# Patient Record
Sex: FEMALE | Race: WHITE | NOT HISPANIC OR LATINO | URBAN - METROPOLITAN AREA
[De-identification: names, ages, dates, MRNs, and addresses within clinical notes are randomized per-mention and may not be internally consistent; named-entity substitution may affect disease eponyms.]

---

## 2022-01-31 ENCOUNTER — OFFICE VISIT (OUTPATIENT)
Dept: URBAN - METROPOLITAN AREA CLINIC 78 | Facility: CLINIC | Age: 57
End: 2022-01-31
Payer: COMMERCIAL

## 2022-01-31 VITALS
HEART RATE: 76 BPM | TEMPERATURE: 98.1 F | HEIGHT: 68 IN | BODY MASS INDEX: 23.98 KG/M2 | WEIGHT: 158.2 LBS | DIASTOLIC BLOOD PRESSURE: 72 MMHG | SYSTOLIC BLOOD PRESSURE: 108 MMHG

## 2022-01-31 DIAGNOSIS — D68.51 FACTOR V LEIDEN: ICD-10-CM

## 2022-01-31 DIAGNOSIS — R14.0 FLATULENCE/GAS PAIN/BELCHING: ICD-10-CM

## 2022-01-31 DIAGNOSIS — Z90.49 HISTORY OF CHOLECYSTECTOMY: ICD-10-CM

## 2022-01-31 DIAGNOSIS — R19.7 ACUTE DIARRHEA: ICD-10-CM

## 2022-01-31 PROBLEM — 428882003: Status: ACTIVE | Noted: 2022-01-31

## 2022-01-31 PROBLEM — 307091009: Status: ACTIVE | Noted: 2022-01-31

## 2022-01-31 PROCEDURE — 99244 OFF/OP CNSLTJ NEW/EST MOD 40: CPT | Performed by: INTERNAL MEDICINE

## 2022-01-31 PROCEDURE — 99204 OFFICE O/P NEW MOD 45 MIN: CPT | Performed by: INTERNAL MEDICINE

## 2022-01-31 RX ORDER — CHOLESTYRAMINE 4 G/9G
1 PACKET MIXED WITH WATER OR NON-CARBONATED DRINK POWDER, FOR SUSPENSION ORAL TWICE A DAY
Qty: 60 | Refills: 0 | OUTPATIENT
Start: 2022-01-31

## 2022-01-31 NOTE — HPI-TODAY'S VISIT:
Patient was referred by Maura Crandall  A copy of this document will be sent to the physician.   Patient presents for gas and belching x 1 year. Previously  she states that she was taking  Imodium one a day occasionally. SInce Christmas she has worsening of her diarrhea needing to take 3 Imodium  She thinks she has SIBO because in oct she took abx for poison IVY, her symptoms were better   Patient has personal hx of Factor V leidin deficiency ; Needs anticoag post procedure for Sclerotherapy for veins.   Last colonoscopy in 1998 :  Never had EGD   Diarrhea : Once a week formed stools  Normally very loose occasionally fat floating and watery stools  Post prandial she feels bloated and it helps with Diarrhea .TUMS      Denies rectal bleeding  Denies constipation  Denies  weight loss  Takes Multivitamin  Denies generalized anxiety   Denies joint pain  Daughter has Crohns disease/IBS    Beano helps a little   Last lab with PCP :  2/20/20221  ALk PO 4 121  ALT 65  AST 24  TSH is normal    TG normal  CBC is normal  Hg 13.9       Has lactose intolerance  Avoid Sodas   No clear food triggers

## 2022-02-02 LAB
DEAMIDATED GLIADIN ABS, IGA: 3
DEAMIDATED GLIADIN ABS, IGG: 2
ENDOMYSIAL ANTIBODY IGA: NEGATIVE
IMMUNOGLOBULIN A, QN, SERUM: 186
T-TRANSGLUTAMINASE (TTG) IGA: <2
T-TRANSGLUTAMINASE (TTG) IGG: <2

## 2022-02-04 ENCOUNTER — TELEPHONE ENCOUNTER (OUTPATIENT)
Dept: URBAN - METROPOLITAN AREA CLINIC 78 | Facility: CLINIC | Age: 57
End: 2022-02-04

## 2022-02-09 LAB
CALPROTECTIN, FECAL: <16
PANCREATIC ELASTASE, FECAL: 320

## 2022-04-19 ENCOUNTER — OFFICE VISIT (OUTPATIENT)
Dept: URBAN - METROPOLITAN AREA SURGERY CENTER 15 | Facility: SURGERY CENTER | Age: 57
End: 2022-04-19

## 2022-12-01 ENCOUNTER — TELEPHONE ENCOUNTER (OUTPATIENT)
Dept: URBAN - METROPOLITAN AREA CLINIC 78 | Facility: CLINIC | Age: 57
End: 2022-12-01

## 2022-12-01 RX ORDER — CHOLESTYRAMINE 4 G/9G
1 PACKET MIXED WITH WATER OR NON-CARBONATED DRINK POWDER, FOR SUSPENSION ORAL TWICE A DAY
Qty: 60 | Refills: 0
Start: 2022-01-31

## 2023-01-26 ENCOUNTER — TELEPHONE ENCOUNTER (OUTPATIENT)
Dept: URBAN - METROPOLITAN AREA CLINIC 78 | Facility: CLINIC | Age: 58
End: 2023-01-26

## 2023-01-26 RX ORDER — CHOLESTYRAMINE 4 G/9G
1 PACKET MIXED WITH WATER OR NON-CARBONATED DRINK POWDER, FOR SUSPENSION ORAL TWICE A DAY
Qty: 60 | Refills: 0
Start: 2022-01-31

## 2023-02-20 ENCOUNTER — DASHBOARD ENCOUNTERS (OUTPATIENT)
Age: 58
End: 2023-02-20

## 2023-02-20 ENCOUNTER — OFFICE VISIT (OUTPATIENT)
Dept: URBAN - METROPOLITAN AREA CLINIC 78 | Facility: CLINIC | Age: 58
End: 2023-02-20
Payer: COMMERCIAL

## 2023-02-20 VITALS
HEART RATE: 97 BPM | TEMPERATURE: 98.1 F | BODY MASS INDEX: 23.7 KG/M2 | SYSTOLIC BLOOD PRESSURE: 102 MMHG | WEIGHT: 156.4 LBS | DIASTOLIC BLOOD PRESSURE: 67 MMHG | HEIGHT: 68 IN | RESPIRATION RATE: 16 BRPM

## 2023-02-20 DIAGNOSIS — K90.89 BILE SALT-INDUCED DIARRHEA: ICD-10-CM

## 2023-02-20 DIAGNOSIS — Z12.11 SCREENING FOR COLON CANCER: ICD-10-CM

## 2023-02-20 PROBLEM — 69980003: Status: ACTIVE | Noted: 2023-02-20

## 2023-02-20 PROCEDURE — 99213 OFFICE O/P EST LOW 20 MIN: CPT | Performed by: INTERNAL MEDICINE

## 2023-02-20 RX ORDER — SODIUM PICOSULFATE, MAGNESIUM OXIDE, AND ANHYDROUS CITRIC ACID 10; 3.5; 12 MG/160ML; G/160ML; G/160ML
160 ML DAY #1 AND 160 ML DAY # 2 LIQUID ORAL ONCE A DAY
Qty: 320 MILLIMETER | OUTPATIENT

## 2023-02-20 RX ORDER — CHOLESTYRAMINE 4 G/9G
1 PACKET MIXED WITH WATER OR NON-CARBONATED DRINK POWDER, FOR SUSPENSION ORAL TWICE A DAY
Qty: 60 | Refills: 0
Start: 2022-01-31

## 2023-02-20 RX ORDER — CHOLESTYRAMINE 4 G/9G
1 PACKET MIXED WITH WATER OR NON-CARBONATED DRINK POWDER, FOR SUSPENSION ORAL TWICE A DAY
Qty: 60 | Refills: 0 | Status: ACTIVE | COMMUNITY
Start: 2022-01-31

## 2023-02-20 NOTE — HPI-TODAY'S VISIT:
Patient was referred by Maura Crandall  A copy of this document will be sent to the physician.   She is  seen in follow-up.  She is primarily seen for refill.  She was treated for bile salt diarrhea and prescribed cholestyramine .She takes it 1 a day with good control  She had a physical this whether her vitamin D was checked  She reports reflux intermittently  Denies dysphagia    Patient has personal hx of Factor V leidin deficiency .Does not need anticoag post procedure for Sclerotherapy for veins.   Last colonoscopy in 1998 :  Never had EGD   Denies Fhx of colon polyps or colon cancer     Denies rectal bleeding  Denies constipation  Denies  weight loss  Takes Multivitamin  Denies generalized anxiety   Denies joint pain  Daughter has Crohns disease/IBS    Beano helps a little   Last lab with PCP :       Has lactose intolerance  Avoid Sodas   No clear food triggers

## 2023-02-20 NOTE — PHYSICAL EXAM PERIPHERAL PULSES:
2+ radial Const: Denies fever, chills  HEENT: Denies blurry vision, sore throat  Neck: Denies neck pain/stiffness  Resp: Denies coughing, SOB  Cardiovascular: + LE edema. Denies CP, palpitations  GI: Denies nausea, vomiting, abdominal pain, diarrhea, constipation, blood in stool  : + dysuria, + hematuria. Denies urinary frequency/urgency  MSK: Denies back pain  Neuro: Denies HA, dizziness, numbness, weakness  Skin: Denies rashes.

## 2023-03-21 ENCOUNTER — CLAIMS CREATED FROM THE CLAIM WINDOW (OUTPATIENT)
Dept: URBAN - METROPOLITAN AREA SURGERY CENTER 15 | Facility: SURGERY CENTER | Age: 58
End: 2023-03-21
Payer: COMMERCIAL

## 2023-03-21 ENCOUNTER — OFFICE VISIT (OUTPATIENT)
Dept: URBAN - METROPOLITAN AREA SURGERY CENTER 15 | Facility: SURGERY CENTER | Age: 58
End: 2023-03-21

## 2023-03-21 DIAGNOSIS — Z12.11 COLON CANCER SCREENING: ICD-10-CM

## 2023-03-21 PROCEDURE — G8907 PT DOC NO EVENTS ON DISCHARG: HCPCS | Performed by: INTERNAL MEDICINE

## 2023-03-21 PROCEDURE — G0121 COLON CA SCRN NOT HI RSK IND: HCPCS | Performed by: INTERNAL MEDICINE

## 2023-06-13 ENCOUNTER — TELEPHONE ENCOUNTER (OUTPATIENT)
Dept: URBAN - METROPOLITAN AREA CLINIC 78 | Facility: CLINIC | Age: 58
End: 2023-06-13

## 2023-06-13 RX ORDER — CHOLESTYRAMINE 4 G/9G
1 PACKET MIXED WITH WATER OR NON-CARBONATED DRINK POWDER, FOR SUSPENSION ORAL TWICE A DAY
Qty: 60 | Refills: 0
Start: 2022-01-31

## 2023-08-08 ENCOUNTER — TELEPHONE ENCOUNTER (OUTPATIENT)
Dept: URBAN - METROPOLITAN AREA CLINIC 78 | Facility: CLINIC | Age: 58
End: 2023-08-08

## 2023-08-08 RX ORDER — CHOLESTYRAMINE 4 G/9G
1 PACKET MIXED WITH WATER OR NON-CARBONATED DRINK POWDER, FOR SUSPENSION ORAL TWICE A DAY
Qty: 60 | Refills: 3
Start: 2022-01-31

## 2024-09-23 ENCOUNTER — WEB ENCOUNTER (OUTPATIENT)
Dept: URBAN - METROPOLITAN AREA CLINIC 78 | Facility: CLINIC | Age: 59
End: 2024-09-23

## 2024-09-23 RX ORDER — CHOLESTYRAMINE 4 G/9G
1 PACKET MIXED WITH WATER OR NON-CARBONATED DRINK POWDER, FOR SUSPENSION ORAL TWICE A DAY
Qty: 180 | Refills: 3
Start: 2022-01-31

## 2025-06-11 ENCOUNTER — OFFICE VISIT (OUTPATIENT)
Dept: URBAN - NONMETROPOLITAN AREA CLINIC 4 | Facility: CLINIC | Age: 60
End: 2025-06-11
Payer: COMMERCIAL

## 2025-06-11 DIAGNOSIS — K90.89 BILE SALT-INDUCED DIARRHEA: ICD-10-CM

## 2025-06-11 DIAGNOSIS — R14.0 BLOATING: ICD-10-CM

## 2025-06-11 DIAGNOSIS — K21.9 GASTROESOPHAGEAL REFLUX DISEASE, UNSPECIFIED WHETHER ESOPHAGITIS PRESENT: ICD-10-CM

## 2025-06-11 PROBLEM — 235595009: Status: ACTIVE | Noted: 2025-06-11

## 2025-06-11 PROCEDURE — 99214 OFFICE O/P EST MOD 30 MIN: CPT | Performed by: REGISTERED NURSE

## 2025-06-11 RX ORDER — SODIUM PICOSULFATE, MAGNESIUM OXIDE, AND ANHYDROUS CITRIC ACID 10; 3.5; 12 MG/160ML; G/160ML; G/160ML
160 ML DAY #1 AND 160 ML DAY # 2 LIQUID ORAL ONCE A DAY
Qty: 320 MILLIMETER | Status: DISCONTINUED | COMMUNITY

## 2025-06-11 RX ORDER — CHOLESTYRAMINE 4 G/9G
1 PACKET MIXED WITH WATER OR NON-CARBONATED DRINK POWDER, FOR SUSPENSION ORAL TWICE A DAY
Qty: 180 | Refills: 3 | Status: ACTIVE | COMMUNITY
Start: 2022-01-31

## 2025-06-11 RX ORDER — OMEPRAZOLE 40 MG/1
1 CAPSULE 1/2 TO 1 HOUR BEFORE MORNING MEAL CAPSULE, DELAYED RELEASE ORAL ONCE A DAY
Qty: 30 | Refills: 1 | OUTPATIENT
Start: 2025-06-11

## 2025-06-11 NOTE — HPI-TODAY'S VISIT:
Patient was referred by Maura Crandall  A copy of this document will be sent to the physician.  She is  seen in follow-up.  She is primarily seen for refill.  She was treated for bile salt diarrhea and prescribed cholestyramine .She takes it 1 a day with good control  She had a physical this whether her vitamin D was checked  She reports reflux intermittently  Denies dysphagia   Patient has personal hx of Factor V leidin deficiency .Does not need anticoag post procedure for Sclerotherapy for veins.  Last colonoscopy in 1998 :  Never had EGD   Denies Fhx of colon polyps or colon cancer  Denies rectal bleeding  Denies constipation  Denies  weight loss  Takes Multivitamin  Denies generalized anxiety   Denies joint pain  Daughter has Crohns disease/IBS   Beano helps a little   Last lab with PCP :  Has lactose intolerance  Avoid Sodas   No clear food triggers  6/11/25: Pt RTC with persistent heartburn, reflux, bloating and gas over the past few months. She has taken Tums and Pepcid as needed with some relief. Denies N/V, dysphagia, abdominal pain, melena, hematochezia or unintentional weight loss. Takes Questran daily for BAD.

## 2025-06-13 LAB — H PYLORI BREATH TEST: NOT DETECTED

## 2025-07-07 ENCOUNTER — ERX REFILL RESPONSE (OUTPATIENT)
Dept: URBAN - NONMETROPOLITAN AREA CLINIC 4 | Facility: CLINIC | Age: 60
End: 2025-07-07

## 2025-07-07 RX ORDER — OMEPRAZOLE 40 MG/1
1 CAPSULE 1/2 TO 1 HOUR BEFORE MORNING MEAL CAPSULE, DELAYED RELEASE ORAL ONCE A DAY
Qty: 30 | Refills: 1 | OUTPATIENT

## 2025-07-07 RX ORDER — OMEPRAZOLE 40 MG/1
TAKE 1 CAPSULE BY MOUTH 1/2 TO 1 HOUR BEFORE MORNING MEAL ONCE A DAY CAPSULE, DELAYED RELEASE ORAL
Qty: 90 CAPSULE | Refills: 1 | OUTPATIENT

## 2025-07-23 ENCOUNTER — OFFICE VISIT (OUTPATIENT)
Dept: URBAN - NONMETROPOLITAN AREA CLINIC 4 | Facility: CLINIC | Age: 60
End: 2025-07-23
Payer: COMMERCIAL

## 2025-07-23 DIAGNOSIS — R14.0 BLOATING: ICD-10-CM

## 2025-07-23 DIAGNOSIS — K21.9 GASTROESOPHAGEAL REFLUX DISEASE, UNSPECIFIED WHETHER ESOPHAGITIS PRESENT: ICD-10-CM

## 2025-07-23 DIAGNOSIS — K90.89 BILE SALT-INDUCED DIARRHEA: ICD-10-CM

## 2025-07-23 PROCEDURE — 99213 OFFICE O/P EST LOW 20 MIN: CPT | Performed by: REGISTERED NURSE

## 2025-07-23 RX ORDER — CHOLESTYRAMINE 4 G/9G
1 PACKET MIXED WITH WATER OR NON-CARBONATED DRINK POWDER, FOR SUSPENSION ORAL TWICE A DAY
Qty: 180 | Refills: 3 | Status: ACTIVE | COMMUNITY
Start: 2022-01-31

## 2025-07-23 RX ORDER — OMEPRAZOLE 40 MG/1
TAKE 1 CAPSULE BY MOUTH 1/2 TO 1 HOUR BEFORE MORNING MEAL ONCE A DAY CAPSULE, DELAYED RELEASE ORAL
Qty: 90 CAPSULE | Refills: 1 | Status: ACTIVE | COMMUNITY

## 2025-07-23 NOTE — HPI-TODAY'S VISIT:
Patient was referred by Maura Crandall  A copy of this document will be sent to the physician.  She is  seen in follow-up.  She is primarily seen for refill.  She was treated for bile salt diarrhea and prescribed cholestyramine .She takes it 1 a day with good control  She had a physical this whether her vitamin D was checked  She reports reflux intermittently  Denies dysphagia   Patient has personal hx of Factor V leidin deficiency .Does not need anticoag post procedure for Sclerotherapy for veins.  Last colonoscopy in 1998 :  Never had EGD   Denies Fhx of colon polyps or colon cancer  Denies rectal bleeding  Denies constipation  Denies  weight loss  Takes Multivitamin  Denies generalized anxiety   Denies joint pain  Daughter has Crohns disease/IBS   Beano helps a little   Last lab with PCP :  Has lactose intolerance  Avoid Sodas   No clear food triggers  6/11/25: Pt RTC with persistent heartburn, reflux, bloating and gas over the past few months. She has taken Tums and Pepcid as needed with some relief. Denies N/V, dysphagia, abdominal pain, melena, hematochezia or unintentional weight loss. Takes Questran daily for BAD.  7/23/25: Pt RTC for f/u. H pylori BT was negative. She reports improvement with omeprazole. Has modified her diet. No other GI complaints.